# Patient Record
Sex: FEMALE | Race: WHITE | Employment: FULL TIME | URBAN - NONMETROPOLITAN AREA
[De-identification: names, ages, dates, MRNs, and addresses within clinical notes are randomized per-mention and may not be internally consistent; named-entity substitution may affect disease eponyms.]

---

## 2023-03-30 ENCOUNTER — OFFICE VISIT (OUTPATIENT)
Dept: PRIMARY CARE CLINIC | Age: 23
End: 2023-03-30
Payer: COMMERCIAL

## 2023-03-30 VITALS
RESPIRATION RATE: 18 BRPM | SYSTOLIC BLOOD PRESSURE: 110 MMHG | BODY MASS INDEX: 22.68 KG/M2 | HEIGHT: 62 IN | OXYGEN SATURATION: 99 % | HEART RATE: 83 BPM | TEMPERATURE: 97.5 F | WEIGHT: 123.25 LBS | DIASTOLIC BLOOD PRESSURE: 60 MMHG

## 2023-03-30 DIAGNOSIS — J02.9 SORE THROAT: ICD-10-CM

## 2023-03-30 DIAGNOSIS — J01.40 ACUTE NON-RECURRENT PANSINUSITIS: Primary | ICD-10-CM

## 2023-03-30 LAB — S PYO AG THROAT QL: NORMAL

## 2023-03-30 PROCEDURE — 87880 STREP A ASSAY W/OPTIC: CPT

## 2023-03-30 PROCEDURE — 99213 OFFICE O/P EST LOW 20 MIN: CPT

## 2023-03-30 RX ORDER — AMOXICILLIN AND CLAVULANATE POTASSIUM 875; 125 MG/1; MG/1
1 TABLET, FILM COATED ORAL 2 TIMES DAILY
Qty: 20 TABLET | Refills: 0 | Status: SHIPPED | OUTPATIENT
Start: 2023-03-30 | End: 2023-04-09

## 2023-03-30 RX ORDER — LORATADINE 10 MG/1
10 TABLET ORAL DAILY
Qty: 30 TABLET | Refills: 0 | Status: SHIPPED | OUTPATIENT
Start: 2023-03-30 | End: 2023-04-29

## 2023-03-30 RX ORDER — PREDNISONE 20 MG/1
20 TABLET ORAL 2 TIMES DAILY
Qty: 10 TABLET | Refills: 0 | Status: SHIPPED | OUTPATIENT
Start: 2023-03-30 | End: 2023-04-04

## 2023-03-30 ASSESSMENT — ENCOUNTER SYMPTOMS
SINUS COMPLAINT: 1
SHORTNESS OF BREATH: 0
NAUSEA: 0
SINUS PRESSURE: 1
SORE THROAT: 1
SINUS PAIN: 1
DIARRHEA: 0
COUGH: 0

## 2023-03-30 NOTE — PROGRESS NOTES
coughing. Probiotics daily may help boost immune system. Alternating hot liquids with cold liquids helps to sooth sore throat  Use Acetaminophen (Tylenol) to help relieve fever, chills or body aches. If allowed, you may alternate using ibuprofen (Motrin) and Tylenol. Do not exceed 3,000mg of Tylenol in a 24 hour time period. Make sure to stay well hydrated by drinking plenty of water. Follow up with primary care provider in 1 to 2 days or as needed if no improvement or worsening of your symptoms. Discussed exam, POCT findings, plan of care, and follow-up at length with patient. Reviewed all prescribed and recommended medications, administration and side effects. Encouraged patient to follow up with PCP or return to the clinic for no improvement and or worsening of symptoms. All questions were answered and they verbalized understanding and were agreeable with the plan. Follow up as needed.         Electronically signed by ABHIJEET Latif CNP on 3/30/2023 at 8:31 AM

## 2023-03-30 NOTE — LETTER
March 30, 2023       Hussain Mock  [address]       Hussain Mock was seen in urgent care on 3/30/2023. May return to work on 3/31/2023. If you have any questions or concerns, please don't hesitate to call.     Sincerely,        Kaylyn Zhu, ABHIJEET - CNP

## 2023-06-01 ENCOUNTER — HOSPITAL ENCOUNTER (OUTPATIENT)
Age: 23
Discharge: HOME | End: 2023-06-01
Payer: COMMERCIAL

## 2023-06-01 DIAGNOSIS — R00.2 PALPITATIONS: ICD-10-CM

## 2023-06-01 LAB
ALBUMIN SERPL-MCNC: 4.7 G/DL (ref 3.5–5.2)
ALBUMIN/GLOB SERPL: 1.7 {RATIO} (ref 1–2.5)
ALP SERPL-CCNC: 55 U/L (ref 35–104)
ALT SERPL-CCNC: 34 U/L (ref 5–33)
ANION GAP SERPL CALCULATED.3IONS-SCNC: 9 MMOL/L (ref 9–17)
AST SERPL-CCNC: 35 U/L
BASOPHILS # BLD: 0.08 K/UL (ref 0–0.2)
BASOPHILS NFR BLD: 1 % (ref 0–2)
BILIRUB SERPL-MCNC: 0.4 MG/DL (ref 0.3–1.2)
BUN SERPL-MCNC: 20 MG/DL (ref 6–20)
BUN/CREAT SERPL: 22 (ref 9–20)
CALCIUM SERPL-MCNC: 9.9 MG/DL (ref 8.6–10.4)
CHLORIDE SERPL-SCNC: 105 MMOL/L (ref 98–107)
CO2 SERPL-SCNC: 27 MMOL/L (ref 20–31)
CREAT SERPL-MCNC: 0.89 MG/DL (ref 0.5–0.9)
EOSINOPHIL # BLD: 0.23 K/UL (ref 0–0.44)
EOSINOPHILS RELATIVE PERCENT: 4 % (ref 1–4)
ERYTHROCYTE [DISTWIDTH] IN BLOOD BY AUTOMATED COUNT: 12.4 % (ref 11.8–14.4)
GFR SERPL CREATININE-BSD FRML MDRD: >60 ML/MIN/1.73M2
GLUCOSE SERPL-MCNC: 87 MG/DL (ref 70–99)
HCT VFR BLD AUTO: 38.2 % (ref 36.3–47.1)
HGB BLD-MCNC: 12.7 G/DL (ref 11.9–15.1)
IMM GRANULOCYTES # BLD AUTO: <0.03 K/UL (ref 0–0.3)
IMM GRANULOCYTES NFR BLD: 0 %
LYMPHOCYTES # BLD: 31 % (ref 24–43)
LYMPHOCYTES NFR BLD: 1.76 K/UL (ref 1.1–3.7)
MCH RBC QN AUTO: 31.5 PG (ref 25.2–33.5)
MCHC RBC AUTO-ENTMCNC: 33.2 G/DL (ref 25.2–33.5)
MCV RBC AUTO: 94.8 FL (ref 82.6–102.9)
MONOCYTES NFR BLD: 0.53 K/UL (ref 0.1–1.2)
MONOCYTES NFR BLD: 9 % (ref 3–12)
NEUTROPHILS NFR BLD: 55 % (ref 36–65)
NEUTS SEG NFR BLD: 3.13 K/UL (ref 1.5–8.1)
NRBC AUTOMATED: 0 PER 100 WBC
PLATELET # BLD AUTO: 273 K/UL (ref 138–453)
PMV BLD AUTO: 11.1 FL (ref 8.1–13.5)
POTASSIUM SERPL-SCNC: 4.3 MMOL/L (ref 3.7–5.3)
PROT SERPL-MCNC: 7.5 G/DL (ref 6.4–8.3)
RBC # BLD AUTO: 4.03 M/UL (ref 3.95–5.11)
SODIUM SERPL-SCNC: 141 MMOL/L (ref 135–144)
T4 FREE SERPL-MCNC: 1.2 NG/DL (ref 0.9–1.7)
TSH SERPL-MCNC: 5.3 UIU/ML (ref 0.3–5)
WBC OTHER # BLD: 5.8 K/UL (ref 3.5–11.3)

## 2023-06-01 PROCEDURE — 84443 ASSAY THYROID STIM HORMONE: CPT

## 2023-06-01 PROCEDURE — 80053 COMPREHEN METABOLIC PANEL: CPT

## 2023-06-01 PROCEDURE — 85027 COMPLETE CBC AUTOMATED: CPT

## 2023-06-01 PROCEDURE — 36415 COLL VENOUS BLD VENIPUNCTURE: CPT

## 2023-06-01 PROCEDURE — 84439 ASSAY OF FREE THYROXINE: CPT

## 2023-06-07 DIAGNOSIS — R79.89 ELEVATED TSH: Primary | ICD-10-CM

## 2023-07-19 ENCOUNTER — TELEPHONE (OUTPATIENT)
Dept: FAMILY MEDICINE CLINIC | Age: 23
End: 2023-07-19

## 2023-07-19 NOTE — TELEPHONE ENCOUNTER
Attempted to reach patient regarding missed appointment on 7/19/2023 . Unable to contact at this time. Left message to reschedule.

## 2023-07-25 ENCOUNTER — HOSPITAL ENCOUNTER (OUTPATIENT)
Age: 23
Discharge: HOME | End: 2023-07-27
Attending: STUDENT IN AN ORGANIZED HEALTH CARE EDUCATION/TRAINING PROGRAM
Payer: COMMERCIAL

## 2023-07-25 DIAGNOSIS — R00.2 PALPITATIONS: ICD-10-CM

## 2023-07-25 PROCEDURE — 93225 XTRNL ECG REC<48 HRS REC: CPT

## 2023-12-27 ENCOUNTER — HOSPITAL ENCOUNTER (OUTPATIENT)
Age: 23
Discharge: HOME | End: 2023-12-27
Payer: COMMERCIAL

## 2023-12-27 ENCOUNTER — OFFICE VISIT (OUTPATIENT)
Dept: FAMILY MEDICINE CLINIC | Age: 23
End: 2023-12-27
Payer: COMMERCIAL

## 2023-12-27 VITALS
HEIGHT: 62 IN | OXYGEN SATURATION: 100 % | RESPIRATION RATE: 16 BRPM | BODY MASS INDEX: 21.9 KG/M2 | TEMPERATURE: 98.1 F | WEIGHT: 119 LBS | DIASTOLIC BLOOD PRESSURE: 68 MMHG | HEART RATE: 100 BPM | SYSTOLIC BLOOD PRESSURE: 112 MMHG

## 2023-12-27 DIAGNOSIS — R79.89 ELEVATED TSH: ICD-10-CM

## 2023-12-27 DIAGNOSIS — Z82.0 FAMILY HISTORY OF HUNTINGTON'S DISEASE: ICD-10-CM

## 2023-12-27 DIAGNOSIS — R00.0 TACHYCARDIA: Primary | ICD-10-CM

## 2023-12-27 LAB — TSH SERPL DL<=0.05 MIU/L-ACNC: 2.2 UIU/ML (ref 0.3–5)

## 2023-12-27 PROCEDURE — 84443 ASSAY THYROID STIM HORMONE: CPT

## 2023-12-27 PROCEDURE — 99213 OFFICE O/P EST LOW 20 MIN: CPT | Performed by: FAMILY MEDICINE

## 2023-12-27 PROCEDURE — 36415 COLL VENOUS BLD VENIPUNCTURE: CPT

## 2023-12-29 DIAGNOSIS — R79.89 ELEVATED TSH: Primary | ICD-10-CM

## 2024-01-31 ENCOUNTER — HOSPITAL ENCOUNTER (OUTPATIENT)
Age: 24
Setting detail: SPECIMEN
Discharge: HOME | End: 2024-01-31
Payer: COMMERCIAL

## 2024-01-31 ENCOUNTER — OFFICE VISIT (OUTPATIENT)
Dept: FAMILY MEDICINE CLINIC | Age: 24
End: 2024-01-31
Payer: COMMERCIAL

## 2024-01-31 VITALS
WEIGHT: 114.9 LBS | BODY MASS INDEX: 21.14 KG/M2 | RESPIRATION RATE: 16 BRPM | HEIGHT: 62 IN | OXYGEN SATURATION: 99 % | HEART RATE: 69 BPM | DIASTOLIC BLOOD PRESSURE: 70 MMHG | SYSTOLIC BLOOD PRESSURE: 104 MMHG | TEMPERATURE: 98.1 F

## 2024-01-31 DIAGNOSIS — Z01.419 ENCOUNTER FOR WELL WOMAN EXAM WITH ROUTINE GYNECOLOGICAL EXAM: Primary | ICD-10-CM

## 2024-01-31 DIAGNOSIS — Z12.4 CERVICAL CANCER SCREENING: ICD-10-CM

## 2024-01-31 PROCEDURE — 99395 PREV VISIT EST AGE 18-39: CPT | Performed by: FAMILY MEDICINE

## 2024-01-31 PROCEDURE — 87624 HPV HI-RISK TYP POOLED RSLT: CPT

## 2024-01-31 PROCEDURE — G0145 SCR C/V CYTO,THINLAYER,RESCR: HCPCS

## 2024-01-31 NOTE — PROGRESS NOTES
[facility]  [address] [place] [place] [address]  [phone]      Marichuy Ziegler is a 23 y.o. female who presents today for her medical conditions/complaints as noted below.  Marichuy Ziegler is c/o of Gynecologic Exam      HPI:     Pt here today for well woman exam.    No h/o Pap smear previously.  Patient's last menstrual period was 2024 (approximate).  Periods are regular and tolerable.  No h/o gynecologic surgeries.  .  No h/o STD's.  Currently sexually active with one male partner ().  Does not currently use anything for contraception; had been OCP's in the past, but stopped 1 year ago.  Has intermittent mild dyspareunia, mainly in specific positions.  Denies dyspareunia or post-coital bleeding.  Denies hot flashes or vaginal dryness.  Denies abnormal vaginal discharge, itching/burning, sores, or rashes.  Denies family h/o ovarian, cervical, or endometrial CA.    Denies breast lumps, nipple discharge, pain, or skin changes.  Has never had mammogram.  Denies family h/o breast or colon CA.        Past Medical History:   Diagnosis Date    Anxiety     Asthma     in childhood when in sports; stable in adulthood    Migraine       History reviewed. No pertinent surgical history.  Family History   Problem Relation Age of Onset    Other Mother         mom is paralyzed from MVA, has a trach, and feeding tube    Hypertension Father     Asthma Father     Huntingtons Disease Maternal Grandmother     Asthma Paternal Grandmother     Thyroid Disease Paternal Grandmother     Other Paternal Grandmother         liver abscess    Heart Attack Paternal Grandfather         first one in late 50's     Social History     Tobacco Use    Smoking status: Never     Passive exposure: Never    Smokeless tobacco: Never   Substance Use Topics    Alcohol use: Not Currently      No current outpatient medications on file.     No current facility-administered medications for this visit.     No Known

## 2024-02-02 LAB
HPV I/H RISK 4 DNA CVX QL NAA+PROBE: NOT DETECTED
HPV SAMPLE: NORMAL
HPV, INTERPRETATION: NORMAL
HPV16 DNA CVX QL NAA+PROBE: NOT DETECTED
HPV18 DNA CVX QL NAA+PROBE: NOT DETECTED
SPECIMEN DESCRIPTION: NORMAL

## 2024-02-29 LAB — CYTOLOGY REPORT: NORMAL

## 2024-03-27 ENCOUNTER — OFFICE VISIT (OUTPATIENT)
Dept: PRIMARY CARE CLINIC | Age: 24
End: 2024-03-27
Payer: COMMERCIAL

## 2024-03-27 VITALS
SYSTOLIC BLOOD PRESSURE: 136 MMHG | TEMPERATURE: 96.3 F | DIASTOLIC BLOOD PRESSURE: 91 MMHG | BODY MASS INDEX: 20.98 KG/M2 | OXYGEN SATURATION: 95 % | WEIGHT: 114 LBS | HEART RATE: 92 BPM | HEIGHT: 62 IN

## 2024-03-27 DIAGNOSIS — J01.40 ACUTE NON-RECURRENT PANSINUSITIS: Primary | ICD-10-CM

## 2024-03-27 PROCEDURE — 99213 OFFICE O/P EST LOW 20 MIN: CPT

## 2024-03-27 RX ORDER — AMOXICILLIN AND CLAVULANATE POTASSIUM 875; 125 MG/1; MG/1
1 TABLET, FILM COATED ORAL 2 TIMES DAILY
Qty: 20 TABLET | Refills: 0 | Status: SHIPPED | OUTPATIENT
Start: 2024-03-27 | End: 2024-04-06

## 2024-03-27 RX ORDER — BENZONATATE 100 MG/1
100 CAPSULE ORAL 3 TIMES DAILY PRN
Qty: 21 CAPSULE | Refills: 0 | Status: SHIPPED | OUTPATIENT
Start: 2024-03-27 | End: 2024-04-10

## 2024-03-27 RX ORDER — PREDNISONE 20 MG/1
20 TABLET ORAL 2 TIMES DAILY
Qty: 10 TABLET | Refills: 0 | Status: SHIPPED | OUTPATIENT
Start: 2024-03-27 | End: 2024-04-01

## 2024-03-27 RX ORDER — FLUTICASONE PROPIONATE 50 MCG
2 SPRAY, SUSPENSION (ML) NASAL DAILY
Qty: 16 G | Refills: 5 | Status: SHIPPED | OUTPATIENT
Start: 2024-03-27

## 2024-03-27 ASSESSMENT — PATIENT HEALTH QUESTIONNAIRE - PHQ9
SUM OF ALL RESPONSES TO PHQ QUESTIONS 1-9: 0
2. FEELING DOWN, DEPRESSED OR HOPELESS: NOT AT ALL
1. LITTLE INTEREST OR PLEASURE IN DOING THINGS: NOT AT ALL
SUM OF ALL RESPONSES TO PHQ QUESTIONS 1-9: 0
SUM OF ALL RESPONSES TO PHQ9 QUESTIONS 1 & 2: 0

## 2024-03-27 ASSESSMENT — ENCOUNTER SYMPTOMS
SHORTNESS OF BREATH: 0
NAUSEA: 0
VOMITING: 0
RHINORRHEA: 1
COUGH: 1
SORE THROAT: 1
SINUS PRESSURE: 1
DIARRHEA: 0

## 2024-03-27 NOTE — PROGRESS NOTES
[facility]  [address]  [place] [address]  Phone: [phone]  Fax: [phone]      Marichuy Ziegler is a 23 y.o. female who presents to the [facility] today for her medical conditions/complaints as noted below. Marichuy Ziegler is c/o of Cough (ST,fever,BA,HA,watery eyes. Sx x 5 days after wisdom teeth out. )          HPI:     Cough  This is a new problem. The current episode started in the past 7 days (x1 week). The problem has been waxing and waning. The cough is Productive of sputum. Associated symptoms include nasal congestion, postnasal drip, rhinorrhea and a sore throat. Pertinent negatives include no chest pain, ear congestion, ear pain, fever or shortness of breath. Treatments tried: OTC cough syrup. The treatment provided mild relief. Her past medical history is significant for asthma (exercise induced asthma in childhood), bronchitis and environmental allergies. There is no history of pneumonia.       Past Medical History:   Diagnosis Date    Anxiety     Asthma     in childhood when in sports; stable in adulthood    Migraine         No Known Allergies    Wt Readings from Last 3 Encounters:   03/27/24 51.7 kg (114 lb)   01/31/24 52.1 kg (114 lb 14.4 oz)   12/27/23 54 kg (119 lb)     BP Readings from Last 3 Encounters:   03/27/24 (!) 136/91   01/31/24 104/70   12/27/23 112/68      Temp Readings from Last 3 Encounters:   03/27/24 (!) 96.3 °F (35.7 °C) (Tympanic)   01/31/24 98.1 °F (36.7 °C) (Temporal)   12/27/23 98.1 °F (36.7 °C) (Temporal)     Pulse Readings from Last 3 Encounters:   03/27/24 92   01/31/24 69   12/27/23 100     SpO2 Readings from Last 3 Encounters:   03/27/24 95%   01/31/24 99%   12/27/23 100%       Subjective:      Review of Systems   Constitutional:  Negative for appetite change and fever.   HENT:  Positive for congestion, postnasal drip, rhinorrhea, sinus pressure and sore throat. Negative for ear pain.

## 2024-03-27 NOTE — PATIENT INSTRUCTIONS
Prednisone x 5 days  Augmentin x 10 days  Tessalon three times a day for cough    Recommended over the counter medications/treatments:  The use of an antihistamine (Claritin or Zyrtec) and a nasal steroid spray (Flonase or Nasacort) may help with sinus congestion and drainage.   Mucinex will also help thin secretions and improve congestion. Works best if drinking plenty of water.  Honey with or without Lemon may also help with coughing.  Probiotics daily may help boost immune system.  Alternating hot liquids with cold liquids helps to sooth sore throat  Use Acetaminophen (Tylenol) to help relieve fever, chills or body aches. If allowed, you may alternate using ibuprofen (Motrin) and Tylenol. Do not exceed 3,000mg of Tylenol in a 24 hour time period.    Make sure to stay well hydrated by drinking plenty of water.    Follow up with primary care provider in 1 to 2 days or as needed if no improvement or worsening of your symptoms.

## 2024-06-10 LAB
CHOLEST SERPL-MCNC: 165 MG/DL (ref 0–199)
CHOLESTEROL/HDL RATIO: 2
GLUCOSE SERPL-MCNC: 93 MG/DL (ref 70–99)
HDLC SERPL-MCNC: 78 MG/DL
LDLC SERPL CALC-MCNC: 69 MG/DL (ref 0–100)
PATIENT FASTING?: NO
TRIGL SERPL-MCNC: 88 MG/DL
VLDLC SERPL CALC-MCNC: 18 MG/DL

## 2024-07-07 ENCOUNTER — PATIENT MESSAGE (OUTPATIENT)
Dept: FAMILY MEDICINE CLINIC | Age: 24
End: 2024-07-07

## 2024-07-08 NOTE — TELEPHONE ENCOUNTER
From: Marichuy Ziegler  To: Dr. Kesha Carroll  Sent: 7/7/2024 1:00 PM EDT  Subject: Vaginal bleeding    Hi Dr. Carroll. I got off my period approximately 6/28. On 7/2 I was having some abdominal pain, then on 7/3, I started spotting, light pink/brownish and it continued until 7/5 then it turned to blood. I am still bleeding. It is pretty continuous much like a light period and i am having occasional mild cramping. My periods are always pretty regular so this is different for me. I am also sexually active and I have not been using protection with my , so I’m not sure if this could be a miscarriage or something else. Thanks in advance

## 2024-07-10 ENCOUNTER — OFFICE VISIT (OUTPATIENT)
Dept: FAMILY MEDICINE CLINIC | Age: 24
End: 2024-07-10
Payer: COMMERCIAL

## 2024-07-10 VITALS
RESPIRATION RATE: 16 BRPM | BODY MASS INDEX: 21.9 KG/M2 | OXYGEN SATURATION: 99 % | HEART RATE: 77 BPM | DIASTOLIC BLOOD PRESSURE: 62 MMHG | WEIGHT: 119 LBS | HEIGHT: 62 IN | SYSTOLIC BLOOD PRESSURE: 106 MMHG | TEMPERATURE: 97.9 F

## 2024-07-10 DIAGNOSIS — N93.9 VAGINAL BLEEDING, ABNORMAL: Primary | ICD-10-CM

## 2024-07-10 PROCEDURE — 99213 OFFICE O/P EST LOW 20 MIN: CPT | Performed by: FAMILY MEDICINE

## 2024-07-10 SDOH — ECONOMIC STABILITY: HOUSING INSECURITY
IN THE LAST 12 MONTHS, WAS THERE A TIME WHEN YOU DID NOT HAVE A STEADY PLACE TO SLEEP OR SLEPT IN A SHELTER (INCLUDING NOW)?: NO

## 2024-07-10 SDOH — ECONOMIC STABILITY: FOOD INSECURITY: WITHIN THE PAST 12 MONTHS, YOU WORRIED THAT YOUR FOOD WOULD RUN OUT BEFORE YOU GOT MONEY TO BUY MORE.: NEVER TRUE

## 2024-07-10 SDOH — ECONOMIC STABILITY: FOOD INSECURITY: WITHIN THE PAST 12 MONTHS, THE FOOD YOU BOUGHT JUST DIDN'T LAST AND YOU DIDN'T HAVE MONEY TO GET MORE.: NEVER TRUE

## 2024-07-10 SDOH — ECONOMIC STABILITY: INCOME INSECURITY: HOW HARD IS IT FOR YOU TO PAY FOR THE VERY BASICS LIKE FOOD, HOUSING, MEDICAL CARE, AND HEATING?: NOT HARD AT ALL

## 2024-07-10 NOTE — PROGRESS NOTES
[facility]  [address] [place] [address]  [phone]      Marichuy Ziegler is a 23 y.o. female who presents today for her medical conditions/complaints as noted below.  aMrichuy Ziegler is c/o of Vaginal Bleeding      HPI:     Pt here today for abnormal vaginal bleeding.    Pt had normal period from 6/22 - 6/27; started spotting again on 7/3-7/4 and then started bleeding a little heavier on 7/5.  Pt took a home pregnancy test on 7/6, which was negative.  Had some clots with her bleeding, which can be normal for her.      Had had cramping on 7/2 before the bleeding re-started  Had some 7/3-7/4 but none since    No h/o menorrhagia or abnormal bleeding    Bleeding starting to decrease yesterday and is now resolved  Noticed some small pink tissue with the light bleeding and pink tinge yesterday          Past Medical History:   Diagnosis Date    Anxiety     Asthma     in childhood when in sports; stable in adulthood    Migraine       No past surgical history on file.  Family History   Problem Relation Age of Onset    Other Mother         mom is paralyzed from MVA, has a trach, and feeding tube    Hypertension Father     Asthma Father     Huntingtons Disease Maternal Grandmother     Asthma Paternal Grandmother     Thyroid Disease Paternal Grandmother     Other Paternal Grandmother         liver abscess    Heart Attack Paternal Grandfather         first one in late 50's     Social History     Tobacco Use    Smoking status: Never     Passive exposure: Never    Smokeless tobacco: Never   Substance Use Topics    Alcohol use: Not Currently      Current Outpatient Medications   Medication Sig Dispense Refill    fluticasone (FLONASE) 50 MCG/ACT nasal spray 2 sprays by Each Nostril route daily (Patient not taking: Reported on 7/10/2024) 16 g 5     No current facility-administered medications for this visit.     No Known Allergies    Health Maintenance   Topic Date Due    COVID-19 Vaccine (1)